# Patient Record
Sex: MALE | Race: WHITE | NOT HISPANIC OR LATINO | Employment: FULL TIME | URBAN - METROPOLITAN AREA
[De-identification: names, ages, dates, MRNs, and addresses within clinical notes are randomized per-mention and may not be internally consistent; named-entity substitution may affect disease eponyms.]

---

## 2019-03-14 ENCOUNTER — HOSPITAL ENCOUNTER (OUTPATIENT)
Dept: URGENT CARE | Facility: CLINIC | Age: 19
Discharge: HOME | End: 2019-03-14
Attending: NURSE PRACTITIONER

## 2019-11-23 ENCOUNTER — HOSPITAL ENCOUNTER (OUTPATIENT)
Dept: URGENT CARE | Facility: CLINIC | Age: 19
Discharge: HOME | End: 2019-11-23
Attending: EMERGENCY MEDICINE

## 2020-07-10 ENCOUNTER — CONVERSION ENCOUNTER (OUTPATIENT)
Dept: FAMILY MEDICINE CLINIC | Facility: CLINIC | Age: 20
End: 2020-07-10

## 2020-07-10 ENCOUNTER — OFFICE VISIT CONVERTED (OUTPATIENT)
Dept: FAMILY MEDICINE CLINIC | Facility: CLINIC | Age: 20
End: 2020-07-10
Attending: NURSE PRACTITIONER

## 2020-07-22 ENCOUNTER — HOSPITAL ENCOUNTER (OUTPATIENT)
Dept: LAB | Facility: HOSPITAL | Age: 20
Discharge: HOME | End: 2020-07-22
Attending: NURSE PRACTITIONER

## 2020-07-22 LAB
ALBUMIN SERPL-MCNC: 4.7 G/DL (ref 3.5–5)
ALBUMIN/GLOB SERPL: 1.7 {RATIO} (ref 1.4–2.6)
ALP SERPL-CCNC: 70 U/L (ref 53–128)
ALT SERPL-CCNC: 18 U/L (ref 10–40)
ANION GAP SERPL CALC-SCNC: 17 MMOL/L (ref 8–19)
AST SERPL-CCNC: 19 U/L (ref 15–50)
BASOPHILS # BLD AUTO: 0.04 10*3/UL (ref 0–0.2)
BASOPHILS NFR BLD AUTO: 0.5 % (ref 0–3)
BILIRUB SERPL-MCNC: 0.87 MG/DL (ref 0.2–1.3)
BUN SERPL-MCNC: 7 MG/DL (ref 5–25)
BUN/CREAT SERPL: 7 {RATIO} (ref 6–20)
CALCIUM SERPL-MCNC: 9.4 MG/DL (ref 8.7–10.4)
CHLORIDE SERPL-SCNC: 100 MMOL/L (ref 99–111)
CHOLEST SERPL-MCNC: 113 MG/DL (ref 107–200)
CHOLEST/HDLC SERPL: 3.1 {RATIO} (ref 3–6)
CONV ABS IMM GRAN: 0.02 10*3/UL (ref 0–0.2)
CONV CO2: 24 MMOL/L (ref 22–32)
CONV IMMATURE GRAN: 0.2 % (ref 0–1.8)
CONV TOTAL PROTEIN: 7.5 G/DL (ref 6.3–8.2)
CREAT UR-MCNC: 1.02 MG/DL (ref 0.7–1.2)
DEPRECATED RDW RBC AUTO: 39.3 FL (ref 35.1–43.9)
EOSINOPHIL # BLD AUTO: 0.07 10*3/UL (ref 0–0.7)
EOSINOPHIL # BLD AUTO: 0.9 % (ref 0–7)
ERYTHROCYTE [DISTWIDTH] IN BLOOD BY AUTOMATED COUNT: 12.1 % (ref 11.6–14.4)
FOLATE SERPL-MCNC: >20 NG/ML (ref 4.8–20)
GFR SERPLBLD BASED ON 1.73 SQ M-ARVRAT: >60 ML/MIN/{1.73_M2}
GLOBULIN UR ELPH-MCNC: 2.8 G/DL (ref 2–3.5)
GLUCOSE SERPL-MCNC: 70 MG/DL (ref 70–99)
HCT VFR BLD AUTO: 45.4 % (ref 42–52)
HDLC SERPL-MCNC: 37 MG/DL (ref 40–60)
HGB BLD-MCNC: 15.5 G/DL (ref 14–18)
LDLC SERPL CALC-MCNC: 64 MG/DL (ref 70–100)
LYMPHOCYTES # BLD AUTO: 3.04 10*3/UL (ref 1–5)
LYMPHOCYTES NFR BLD AUTO: 37.3 % (ref 20–45)
MCH RBC QN AUTO: 30 PG (ref 27–31)
MCHC RBC AUTO-ENTMCNC: 34.1 G/DL (ref 33–37)
MCV RBC AUTO: 87.8 FL (ref 80–96)
MONOCYTES # BLD AUTO: 0.57 10*3/UL (ref 0.2–1.2)
MONOCYTES NFR BLD AUTO: 7 % (ref 3–10)
NEUTROPHILS # BLD AUTO: 4.4 10*3/UL (ref 2–8)
NEUTROPHILS NFR BLD AUTO: 54.1 % (ref 30–85)
NRBC CBCN: 0 % (ref 0–0.7)
OSMOLALITY SERPL CALC.SUM OF ELEC: 280 MOSM/KG (ref 273–304)
PLATELET # BLD AUTO: 272 10*3/UL (ref 130–400)
PMV BLD AUTO: 9.9 FL (ref 9.4–12.4)
POTASSIUM SERPL-SCNC: 3.7 MMOL/L (ref 3.5–5.3)
RBC # BLD AUTO: 5.17 10*6/UL (ref 4.7–6.1)
SODIUM SERPL-SCNC: 137 MMOL/L (ref 135–147)
TRIGL SERPL-MCNC: 62 MG/DL (ref 40–150)
VIT B12 SERPL-MCNC: 754 PG/ML (ref 211–911)
VLDLC SERPL-MCNC: 12 MG/DL (ref 5–37)
WBC # BLD AUTO: 8.14 10*3/UL (ref 4.8–10.8)

## 2020-07-23 LAB
T4 FREE SERPL-MCNC: 1.7 NG/DL (ref 0.9–1.8)
TSH SERPL-ACNC: 2.64 M[IU]/L (ref 0.27–4.2)

## 2020-10-05 ENCOUNTER — HOSPITAL ENCOUNTER (OUTPATIENT)
Dept: URGENT CARE | Facility: CLINIC | Age: 20
Discharge: HOME | End: 2020-10-05
Attending: PHYSICIAN ASSISTANT

## 2020-10-17 ENCOUNTER — HOSPITAL ENCOUNTER (OUTPATIENT)
Dept: URGENT CARE | Facility: CLINIC | Age: 20
Discharge: HOME | End: 2020-10-17
Attending: FAMILY MEDICINE

## 2021-05-10 NOTE — H&P
History and Physical      Patient Name: Bakari Butterfield   Patient ID: [id number]   Sex: Male   YOB: 2000    Primary Care Provider: Allegra Wu MD    Visit Date: July 10, 2020    Provider: CHRISTIANO White   Location: [facility]   Location Address: [address] [place] [address], [address] [place] [address]  [id number]   Location Phone: [phone]          Chief Complaint     The patient is here to establish care and having some anxiety.       History Of Present Illness  Bakari Butterfield is a 20 year old /White male who presents for evaluation and treatment of:      HAs feelings of being overwhelmed and will lash out and get angry or will internalize and shut down.  States will be off kilter for 30 minutes to 1 hour.  Thinks he has anxiety.       Past Medical History  Disease Name Date Onset Notes   Broken Bones --  --          Past Surgical History  Procedure Name Date Notes   Breast reduction surgery --  gynecomastia   Teeth extraction --  --          Allergy List  Allergen Name Date Reaction Notes   NO KNOWN DRUG ALLERGIES --  --  --          Family Medical History  Disease Name Relative/Age Notes   Family history of lung cancer  --    Family history of diabetes mellitus  --          Social History  Finding Status Start/Stop Quantity Notes   Tobacco Current every day 17/-- 0.5 ppd --          Immunizations  NameDate Admin Mfg Trade Name Lot Number Route Inj VIS Given VIS Publication   Tkentqblo03/01/2019 SKB Fluarix, quadrivalent, preservative free [id number] NE NE 07/10/2020    Comments:          Review of Systems  · Constitutional  o Denies  o : fever, fatigue, weight loss, weight gain  · Cardiovascular  o Denies  o : lower extremity edema, claudication, chest pressure, palpitations  · Respiratory  o Denies  o : shortness of breath, wheezing, cough, hemoptysis, dyspnea on exertion  · Gastrointestinal  o Denies  o : nausea, vomiting, diarrhea, constipation, abdominal  "pain  · Psychiatric  o Admits  o : anxiety      Vitals  Date Time BP Position Site L\R Cuff Size HR RR TEMP (F) WT  HT  BMI kg/m2 BSA m2 O2 Sat HC       07/10/2020 03:14 /78 Sitting    72 - R 18 98.4 162lbs 0oz 5'  10\" 23.24 1.91 98 %          Physical Examination  · Constitutional  o Appearance  o : well-nourished, well developed, alert, in no acute distress  · Eyes  o Conjunctivae  o : conjunctivae normal  o Sclerae  o : sclerae white  o Pupils and Irises  o : pupils equal, round, and reactive to light and accommodation bilaterally  o Corneas  o : tear film normal, no lesions present  o Eyelids/Ocular Adnexae  o : eyelid appearance normal, no exudates present, eye moisture level normal  · Respiratory  o Respiratory Effort  o : breathing unlabored  o Inspection of Chest  o : normal appearance, no retractions  o Auscultation of Lungs  o : normal breath sounds throughout  · Cardiovascular  o Heart  o :   § Auscultation of Heart  § : regular rate and rhythm without murmur, PMI normal  o Peripheral Vascular System  o :   § Extremities  § : no cyanosis, clubbing or edema; less than 2 second refill noted  · Musculoskeletal  o General  o : No joint swelling or deformity noted. Muscle tone, strength and development grossly normal.  · Skin and Subcutaneous Tissue  o General Inspection  o : no rashes or lesions present, no areas of discoloration  · Neurologic  o Mental Status Examination  o : judgement, insight intact, modd and affect appropriate  o Motor Examination  o : strength grossly intact in all four extremities  o Gait and Station  o : normal gait, able to stand without difficulty          Assessment  · Screening for depression     V79.0/Z13.89  · Screening for lipid disorders     V77.91/Z13.220  · Annual physical exam     V70.0/Z00.00  · Anxiety disorder     300.00/F41.9  · Fatigue     780.79/R53.83       Discussed but possible bipolar but since he is not been on any medications and he really feels like his " anxiety will start with the medication like Lexapro.  If he is not finding that is beneficial or he finds that it is actually making things worse we may switch him over to something else.       Plan  · Orders  o Annual depression screening, 15 minutes (79580, ) - V79.0/Z13.89 - 07/10/2020  o ACO-18: Negative screen for clinical depression using a standardized tool () - V79.0/Z13.89 - 07/10/2020  o Lipid Panel [facility] ([id number]) - V77.91/Z13.220 - 07/10/2020  o CBC with Auto Diff [facility] ([id number]) - 780.79/R53.83 - 07/10/2020  o CMP [facility] ([id number]) - 780.79/R53.83 - 07/10/2020  o Thyroid Profile (62085, 97558, THYII) - 780.79/R53.83 - 07/10/2020  o B12 Folate levels (B12FO) - 780.79/R53.83 - 07/10/2020  o ACO-39: Current medications updated and reviewed () - - 07/10/2020  o ACO-14: Influenza immunization administered or previously received () - - 07/10/2020  · Medications  o Lexapro 10 mg oral tablet   SIG: take 1 tablet (10 mg) by oral route once daily for 30 days   DISP: (30) tablets with 0 refills  Prescribed on 07/10/2020     o Medications have been Reconciled  o Transition of Care or Provider Policy  · Instructions  o Depression Screen completed and scanned into the EMR under the designated folder within the patient's documents.  o Today's PHQ-9 result is _11__  o The provider screening met the required time of 15 minutes.  o Reviewed health maintenance flowsheet and updated information. Orders were placed and/or patient's response was documented.  o Patient was educated/instructed on their diagnosis, treatment and medications prior to discharge from the clinic today.  o Minutes spent with patient including greater than 50% in Education/Counseling/Care Coordination.  o Time spent with the patient was minutes, more than 50% face to face.            Electronically Signed by: CHRISTIANO White -Author on July 10, 2020 03:42:48 PM

## 2021-05-15 VITALS
RESPIRATION RATE: 18 BRPM | OXYGEN SATURATION: 98 % | HEIGHT: 70 IN | SYSTOLIC BLOOD PRESSURE: 110 MMHG | TEMPERATURE: 98.4 F | HEART RATE: 72 BPM | WEIGHT: 162 LBS | DIASTOLIC BLOOD PRESSURE: 78 MMHG | BODY MASS INDEX: 23.19 KG/M2

## 2021-09-22 PROCEDURE — 87081 CULTURE SCREEN ONLY: CPT | Performed by: NURSE PRACTITIONER

## 2021-09-25 ENCOUNTER — TELEPHONE (OUTPATIENT)
Dept: URGENT CARE | Facility: CLINIC | Age: 21
End: 2021-09-25

## 2021-09-25 NOTE — TELEPHONE ENCOUNTER
----- Message from CHRISTIANO Russell sent at 9/24/2021  9:54 AM EDT -----  Please notify patient of negative beta strep test.

## 2022-01-26 ENCOUNTER — TELEPHONE (OUTPATIENT)
Dept: URGENT CARE | Facility: CLINIC | Age: 22
End: 2022-01-26

## 2022-01-26 PROCEDURE — U0004 COV-19 TEST NON-CDC HGH THRU: HCPCS | Performed by: FAMILY MEDICINE

## 2022-01-27 NOTE — TELEPHONE ENCOUNTER
----- Message from CHRISTIANO Bobo sent at 1/26/2022  8:02 PM EST -----  Please call the patient regarding his negative result.

## 2022-12-20 ENCOUNTER — APPOINTMENT (OUTPATIENT)
Dept: ULTRASOUND IMAGING | Facility: HOSPITAL | Age: 22
End: 2022-12-20

## 2022-12-20 ENCOUNTER — HOSPITAL ENCOUNTER (EMERGENCY)
Facility: HOSPITAL | Age: 22
Discharge: HOME | End: 2022-12-20
Attending: EMERGENCY MEDICINE | Admitting: EMERGENCY MEDICINE

## 2022-12-20 VITALS
HEIGHT: 70 IN | BODY MASS INDEX: 24.78 KG/M2 | DIASTOLIC BLOOD PRESSURE: 56 MMHG | RESPIRATION RATE: 18 BRPM | HEART RATE: 80 BPM | TEMPERATURE: 97.8 F | OXYGEN SATURATION: 100 % | SYSTOLIC BLOOD PRESSURE: 127 MMHG | WEIGHT: 173.06 LBS

## 2022-12-20 DIAGNOSIS — N43.3 HYDROCELE, RIGHT: Primary | ICD-10-CM

## 2022-12-20 LAB
BILIRUB UR QL STRIP: NEGATIVE
CLARITY UR: CLEAR
COLOR UR: YELLOW
GLUCOSE UR STRIP-MCNC: NEGATIVE MG/DL
HGB UR QL STRIP.AUTO: NEGATIVE
KETONES UR QL STRIP: NEGATIVE
LEUKOCYTE ESTERASE UR QL STRIP.AUTO: NEGATIVE
NITRITE UR QL STRIP: NEGATIVE
PH UR STRIP.AUTO: 6.5 [PH] (ref 5–8)
PROT UR QL STRIP: NEGATIVE
SP GR UR STRIP: 1.02 (ref 1–1.03)
UROBILINOGEN UR QL STRIP: NORMAL

## 2022-12-20 PROCEDURE — 81003 URINALYSIS AUTO W/O SCOPE: CPT | Performed by: EMERGENCY MEDICINE

## 2022-12-20 PROCEDURE — 76870 US EXAM SCROTUM: CPT

## 2022-12-20 PROCEDURE — 99283 EMERGENCY DEPT VISIT LOW MDM: CPT

## 2022-12-20 NOTE — DISCHARGE INSTRUCTIONS
Take anti-inflammatory medications as needed for pain control.  Ensure that you wear supportive briefs.

## 2022-12-20 NOTE — ED PROVIDER NOTES
Time: 3:20 PM EST  Arrived by: private car  Chief Complaint: Testicle pain  History provided by: Patient  History is limited by: N/A     History of Present Illness:  Patient is a 22 y.o. year old male who presents to the emergency department with complaints of sudden right sided testicular pain and swelling which onset today at 1 pm. Pt denies any recent injury or trauma. Pt states he later began feeling nauseous and short of breath on exertion, denies any recent fever, chills, abdominal pain, vomiting or urinary symptoms. Pt denies experiencing similar symptoms in the past. Pt denies possibility of STD.       History provided by:  Patient  Testicle Pain  Location:  Right testicle  Severity:  Mild  Onset quality:  Sudden  Duration:  3 hours  Timing:  Constant  Progression:  Unchanged  Chronicity:  New  Relieved by:  None tried  Worsened by:  Nothing  Associated symptoms: nausea and shortness of breath    Associated symptoms: no abdominal pain, no chest pain, no cough, no fever and no vomiting    Associated symptoms comment:  Pt denied any urinary symptoms      Similar Symptoms Previously: No  Recently seen: No      Patient Care Team  Primary Care Provider: Provider, No Known    Past Medical History:     No Known Allergies  Past Medical History:   Diagnosis Date   • Scoliosis      Past Surgical History:   Procedure Laterality Date   • DENTAL PROCEDURE     • GYNECOMASTIA REMOVAL       History reviewed. No pertinent family history.    Home Medications:  Prior to Admission medications    Not on File        Social History:   Social History     Tobacco Use   • Smoking status: Every Day     Packs/day: 0.25     Types: Cigarettes   • Smokeless tobacco: Never   Vaping Use   • Vaping Use: Every day   Substance Use Topics   • Alcohol use: Never   • Drug use: Never         Review of Systems:  Review of Systems   Constitutional: Negative for fever.   Respiratory: Positive for shortness of breath. Negative for cough.   "  Cardiovascular: Negative for chest pain.   Gastrointestinal: Positive for nausea. Negative for abdominal pain and vomiting.   Genitourinary: Positive for scrotal swelling and testicular pain. Negative for dysuria.   All other systems reviewed and are negative.       Physical Exam:  /56 (BP Location: Right arm, Patient Position: Sitting)   Pulse 80   Temp 97.8 °F (36.6 °C) (Oral)   Resp 18   Ht 177.8 cm (70\")   Wt 78.5 kg (173 lb 1 oz)   SpO2 100%   BMI 24.83 kg/m²     Physical Exam  Vitals and nursing note reviewed.   Constitutional:       General: He is not in acute distress.     Appearance: Normal appearance. He is normal weight. He is not ill-appearing, toxic-appearing or diaphoretic.   HENT:      Head: Normocephalic and atraumatic.      Mouth/Throat:      Mouth: Mucous membranes are moist.   Eyes:      General: No scleral icterus.        Right eye: No discharge.         Left eye: No discharge.      Extraocular Movements: Extraocular movements intact.      Conjunctiva/sclera: Conjunctivae normal.      Pupils: Pupils are equal, round, and reactive to light.   Pulmonary:      Effort: Pulmonary effort is normal. No respiratory distress.   Abdominal:      General: Abdomen is flat. There is no distension.      Palpations: Abdomen is soft. There is no mass.      Tenderness: There is no abdominal tenderness. There is no guarding.      Hernia: No hernia is present.   Genitourinary:     Pubic Area: No rash.       Penis: Normal.       Testes: Cremasteric reflex is present.         Right: Tenderness and swelling (mild) present. Mass or varicocele not present. Right testis is descended. Cremasteric reflex is present.          Left: Mass, tenderness, swelling or varicocele not present. Cremasteric reflex is present.       Epididymis:      Right: Normal. Not inflamed or enlarged. No mass or tenderness.      Left: Normal. Not inflamed or enlarged. No mass or tenderness.      Comments: No erythema or ecchymosis " noted to right testicle  Musculoskeletal:         General: Normal range of motion.      Cervical back: Normal range of motion and neck supple.   Skin:     General: Skin is warm and dry.   Neurological:      Mental Status: He is alert and oriented to person, place, and time. Mental status is at baseline.   Psychiatric:         Mood and Affect: Mood normal.         Behavior: Behavior normal.                Medications in the Emergency Department:  Medications - No data to display     Labs  Lab Results (last 24 hours)     Procedure Component Value Units Date/Time    Urinalysis With Microscopic If Indicated (No Culture) - Urine, Clean Catch [[id number]]  (Normal) Collected: 12/20/22 1707    Specimen: Urine, Clean Catch Updated: 12/20/22 1720     Color, UA Yellow     Appearance, UA Clear     pH, UA 6.5     Specific Gravity, UA 1.024     Glucose, UA Negative     Ketones, UA Negative     Bilirubin, UA Negative     Blood, UA Negative     Protein, UA Negative     Leuk Esterase, UA Negative     Nitrite, UA Negative     Urobilinogen, UA 1.0 E.U./dL    Narrative:      Urine microscopic not indicated.           Imaging:  US Scrotum & Testicles    Result Date: 12/20/2022  PROCEDURE: US SCROTUM AND TESTICLES  COMPARISON: None  INDICATIONS: Testicular/Scrotal Pain  TECHNIQUE: Testicular ultrasound.  FINDINGS:   The right testicle measures 5.1 cm. . The left testicle measures 5.4 cm. The testicles have homogeneous echogenicity. There is symmetric flow in the testicles on Doppler imaging. There is no evidence of torsion. There is no evidence of intratesticular mass or abnormal calcification.  Small right hydrocele.  IMPRESSION: Small right hydrocele.  No evidence of intratesticular mass or torsion.  REILLY WHYTE MD       Electronically Signed and Approved By: REILLY WHYTE MD on 12/20/2022 at 16:04               Procedures:  Procedures    Progress  ED Course as of 12/20/22 7325   Tue Dec 20, 2022   1537 I went to evaluate  patient at this time and he is currently in ultrasound.  Will return for initial evaluation. [MD]      ED Course User Index  [MD] Alvaro Jorge PA-C                            Medical Decision Making:  MDM  Number of Diagnoses or Management Options  Diagnosis management comments: Urinalysis has no acute findings for UTI.  Testicular ultrasound noted to have right hydrocele.  I have spoken with patient. I have explained the patient´s condition, diagnoses and treatment plan based on the information available to me at this time. I have answered the patient's questions and addressed any concerns. The patient has a good  understanding of the patient´s diagnosis, condition, and treatment plan as can be expected at this point. The vital signs have been stable. The patient´s condition is stable and appropriate for discharge from the emergency department.      The patient will pursue further outpatient evaluation with the primary care physician or other designated or consulting physician as outlined in the discharge instructions. They are agreeable to this plan of care and follow-up instructions have been explained in detail. The patient has received these instructions in written format and have expressed an understanding of the discharge instructions. The patient is aware that any significant change in condition or worsening of symptoms should prompt an immediate return to this or the closest emergency department or call to 911.       Amount and/or Complexity of Data Reviewed  Clinical lab tests: reviewed and ordered  Tests in the radiology section of CPT®: reviewed and ordered    Risk of Complications, Morbidity, and/or Mortality  Presenting problems: moderate  Diagnostic procedures: low  Management options: low    Patient Progress  Patient progress: stable       Final diagnoses:   Hydrocele, right        Disposition:  ED Disposition     ED Disposition   Discharge    Condition   Stable    Comment   --             This  medical record created using voice recognition software and a virtual scribe.    Documentation assistance provided by Amy Rey acting as scribe for Alvaro Jorge PA-C. Information recorded by the scribe was done at my direction and has been verified and validated by me.          Amy Rey  12/20/22 1611       Alvaro Jorge PA-C  12/20/22 5879

## 2023-09-18 ENCOUNTER — OFFICE VISIT (OUTPATIENT)
Dept: FAMILY MEDICINE CLINIC | Facility: CLINIC | Age: 23
End: 2023-09-18
Payer: COMMERCIAL

## 2023-09-18 VITALS
BODY MASS INDEX: 22.3 KG/M2 | OXYGEN SATURATION: 98 % | HEART RATE: 85 BPM | WEIGHT: 155.8 LBS | DIASTOLIC BLOOD PRESSURE: 78 MMHG | SYSTOLIC BLOOD PRESSURE: 118 MMHG | RESPIRATION RATE: 18 BRPM | HEIGHT: 70 IN | TEMPERATURE: 98.9 F

## 2023-09-18 DIAGNOSIS — F41.9 ANXIETY: ICD-10-CM

## 2023-09-18 DIAGNOSIS — R63.4 WEIGHT LOSS: Primary | ICD-10-CM

## 2023-09-18 DIAGNOSIS — Z00.00 ANNUAL PHYSICAL EXAM: ICD-10-CM

## 2023-09-18 PROCEDURE — 99395 PREV VISIT EST AGE 18-39: CPT | Performed by: NURSE PRACTITIONER

## 2023-09-18 RX ORDER — ESCITALOPRAM OXALATE 5 MG/1
5 TABLET ORAL DAILY
Qty: 30 TABLET | Refills: 1 | Status: SHIPPED | OUTPATIENT
Start: 2023-09-18

## 2023-09-18 NOTE — PROGRESS NOTES
Chief Complaint  Anxiety and Depression (Needing to talk to some one )    Subjective        Bakari Butterfield presents to [facility]  History of Present Illness  Can't sleep and constantly overthinking things so can't sleep.  States has worsened over the last 2 months.  Has been working more was 4-5 days a week now is working 7 days a week.  Relationship with significant other has changed.  States that worrying.  And was prescribed something in the past lexapro and worried about taking something.      Has lost 45 pounds but has decreased portions and eating only one time a day because of stress.  States doesn't have abdominal pain or blood in stool .  Anxiety  Patient reports no chest pain, dizziness, palpitations or shortness of breath.     His past medical history is significant for depression.   DepressionPatient is not experiencing: palpitations and shortness of breath.        The following portions of the patient's history were personally reviewed and updated as appropriate: allergies, current medications, past medical history, past surgical history, past family history, and past social history.     Body mass index is 22.35 kg/m².    BMI is within normal parameters. No other follow-up for BMI required.      Past History:    Medical History: has a past medical history of Scoliosis.     Surgical History: has a past surgical history that includes Gynecomastia Removal and Dental surgery.     Family History: family history is not on file.     Social History: reports that he does not have a smoking history on file. He has never used smokeless tobacco. He reports that he does not currently use alcohol. He reports that he does not use drugs.    Allergies: Patient has no known allergies.          Current Outpatient Medications:     escitalopram (Lexapro) 5 MG tablet, Take 1 tablet by mouth Daily., Disp: 30 tablet, Rfl: 1    There are no discontinued medications.      Review of  "Systems   Constitutional:  Negative for fever.   Respiratory:  Negative for cough and shortness of breath.    Cardiovascular:  Negative for chest pain, palpitations and leg swelling.   Neurological:  Negative for dizziness, weakness, numbness and headache.      Objective         Vitals:    09/18/23 1524   BP: 118/78   Pulse: 85   Resp: 18   Temp: 98.9 °F (37.2 °C)   SpO2: 98%   Weight: 70.7 kg (155 lb 12.8 oz)   Height: 177.8 cm (70\")     Body mass index is 22.35 kg/m².         Physical Exam  Vitals reviewed.   Constitutional:       Appearance: Normal appearance. He is well-developed.   HENT:      Head: Normocephalic and atraumatic.      Mouth/Throat:      Pharynx: No oropharyngeal exudate.   Eyes:      Conjunctiva/sclera: Conjunctivae normal.      Pupils: Pupils are equal, round, and reactive to light.   Cardiovascular:      Rate and Rhythm: Normal rate and regular rhythm.      Pulses:           Carotid pulses are 2+ on the right side and 2+ on the left side.       Posterior tibial pulses are 2+ on the right side and 2+ on the left side.      Heart sounds: Normal heart sounds. No murmur heard.    No friction rub. No gallop.   Pulmonary:      Effort: Pulmonary effort is normal.      Breath sounds: Normal breath sounds. No wheezing or rhonchi.   Abdominal:      General: Bowel sounds are normal.      Palpations: Abdomen is soft.      Tenderness: There is abdominal tenderness.   Musculoskeletal:         General: Normal range of motion.      Cervical back: Normal range of motion.      Right lower leg: No edema.      Left lower leg: No edema.   Skin:     General: Skin is warm and dry.   Neurological:      Mental Status: He is alert and oriented to person, place, and time.   Psychiatric:         Mood and Affect: Mood and affect normal.         Behavior: Behavior normal.         Thought Content: Thought content normal.         Judgment: Judgment normal.           Result Review :               Assessment and Plan "     Diagnoses and all orders for this visit:    1. Weight loss (Primary)    2. Annual physical exam  -     Comprehensive Metabolic Panel; Future  -     Lipid Panel With / Chol / HDL Ratio; Future  -     Urinalysis With Culture If Indicated -; Future  -     CBC & Differential; Future  -     TSH Rfx On Abnormal To Free T4; Future    3. Anxiety  -     escitalopram (Lexapro) 5 MG tablet; Take 1 tablet by mouth Daily.  Dispense: 30 tablet; Refill: 1    Genetic testing for medication.          Follow Up     Return in about 1 month (around 10/18/2023).    Patient was given instructions and counseling regarding his condition or for health maintenance advice. Please see specific information pulled into the AVS if appropriate.

## 2023-10-02 ENCOUNTER — PATIENT MESSAGE (OUTPATIENT)
Dept: FAMILY MEDICINE CLINIC | Facility: CLINIC | Age: 23
End: 2023-10-02
Payer: COMMERCIAL

## 2023-10-02 DIAGNOSIS — F41.9 ANXIETY: ICD-10-CM

## 2023-10-03 RX ORDER — ESCITALOPRAM OXALATE 10 MG/1
10 TABLET ORAL DAILY
Qty: 90 TABLET | Refills: 1 | Status: SHIPPED | OUTPATIENT
Start: 2023-10-03

## 2023-10-03 NOTE — TELEPHONE ENCOUNTER
From: Bakair Butterfield  To: Emanuel Magdaleno  Sent: 10/2/2023 5:25 PM EDT  Subject: New medication     So far I can tell a big difference with my anxiety on the medicine. I still get anxious but it p  As bad or as often. I do still feel like I’m having strong issues with being depressed though. But so far so good.

## 2024-08-24 ENCOUNTER — HOSPITAL ENCOUNTER (EMERGENCY)
Facility: HOSPITAL | Age: 24
Discharge: HOME | End: 2024-08-24
Attending: EMERGENCY MEDICINE
Payer: COMMERCIAL

## 2024-08-24 ENCOUNTER — APPOINTMENT (OUTPATIENT)
Dept: CT IMAGING | Facility: HOSPITAL | Age: 24
End: 2024-08-24
Payer: COMMERCIAL

## 2024-08-24 ENCOUNTER — APPOINTMENT (OUTPATIENT)
Dept: GENERAL RADIOLOGY | Facility: HOSPITAL | Age: 24
End: 2024-08-24
Payer: COMMERCIAL

## 2024-08-24 VITALS
RESPIRATION RATE: 18 BRPM | HEART RATE: 103 BPM | WEIGHT: 178.79 LBS | BODY MASS INDEX: 25.6 KG/M2 | DIASTOLIC BLOOD PRESSURE: 87 MMHG | SYSTOLIC BLOOD PRESSURE: 132 MMHG | HEIGHT: 70 IN | OXYGEN SATURATION: 98 % | TEMPERATURE: 97.8 F

## 2024-08-24 DIAGNOSIS — S02.85XA ORBIT FRACTURE, LEFT, CLOSED, INITIAL ENCOUNTER: ICD-10-CM
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
## 2024-08-24 DIAGNOSIS — S09.22XA TRAUMATIC RUPTURE OF LEFT EAR DRUM, INITIAL ENCOUNTER: ICD-10-CM

## 2024-08-24 PROCEDURE — 96374 THER/PROPH/DIAG INJ IV PUSH: CPT

## 2024-08-24 PROCEDURE — 72072 X-RAY EXAM THORAC SPINE 3VWS: CPT

## 2024-08-24 PROCEDURE — 72125 CT NECK SPINE W/O DYE: CPT

## 2024-08-24 PROCEDURE — 25010000002 KETOROLAC TROMETHAMINE PER 15 MG: Performed by: EMERGENCY MEDICINE

## 2024-08-24 PROCEDURE — 99284 EMERGENCY DEPT VISIT MOD MDM: CPT

## 2024-08-24 PROCEDURE — 70450 CT HEAD/BRAIN W/O DYE: CPT

## 2024-08-24 RX ORDER — NAPROXEN 500 MG/1
500 TABLET ORAL 2 TIMES DAILY WITH MEALS
Qty: 14 TABLET | Refills: 0 | Status: SHIPPED | OUTPATIENT
Start: 2024-08-24

## 2024-08-24 RX ORDER — OFLOXACIN 3 MG/ML
3 SOLUTION AURICULAR (OTIC) DAILY
Qty: 2 ML | Refills: 0 | Status: SHIPPED | OUTPATIENT
Start: 2024-08-24 | End: 2024-08-31

## 2024-08-24 RX ORDER — OXYCODONE AND ACETAMINOPHEN 5; 325 MG/1; MG/1
1 TABLET ORAL EVERY 6 HOURS PRN
Qty: 12 TABLET | Refills: 0 | Status: SHIPPED | OUTPATIENT
Start: 2024-08-24

## 2024-08-24 RX ORDER — KETOROLAC TROMETHAMINE 30 MG/ML
30 INJECTION, SOLUTION INTRAMUSCULAR; INTRAVENOUS ONCE
Status: COMPLETED | OUTPATIENT
Start: 2024-08-24 | End: 2024-08-24

## 2024-08-24 RX ADMIN — KETOROLAC TROMETHAMINE 30 MG: 30 INJECTION, SOLUTION INTRAMUSCULAR; INTRAVENOUS at 02:58
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
## 2024-08-30 ENCOUNTER — PATIENT MESSAGE (OUTPATIENT)
Dept: FAMILY MEDICINE CLINIC | Facility: CLINIC | Age: 24
End: 2024-08-30
Payer: COMMERCIAL

## 2024-09-03 NOTE — TELEPHONE ENCOUNTER
From: Bakari Butterfield  To: Emanuel Magdaleno  Sent: 8/30/2024 9:15 PM EDT  Subject: CT and Xrays    Would you please look at results from [facility] from 8/24?     Thank you!

## 2024-09-06 ENCOUNTER — TELEPHONE (OUTPATIENT)
Dept: FAMILY MEDICINE CLINIC | Facility: CLINIC | Age: 24
End: 2024-09-06

## 2024-09-06 NOTE — TELEPHONE ENCOUNTER
LVMTCB    Patient missed their appointment scheduled on 9/6 with Kalpesh    Would patient like to be rescheduled?    No show letter sent to patient either via [facility] or mail.     HUB TO SHARE AND RELAY

## 2024-09-20 ENCOUNTER — TELEPHONE (OUTPATIENT)
Dept: FAMILY MEDICINE CLINIC | Facility: CLINIC | Age: 24
End: 2024-09-20
Payer: COMMERCIAL